# Patient Record
Sex: FEMALE | Race: OTHER | HISPANIC OR LATINO
[De-identification: names, ages, dates, MRNs, and addresses within clinical notes are randomized per-mention and may not be internally consistent; named-entity substitution may affect disease eponyms.]

---

## 2018-08-19 PROBLEM — Z00.00 ENCOUNTER FOR PREVENTIVE HEALTH EXAMINATION: Status: ACTIVE | Noted: 2018-08-19

## 2018-09-18 ENCOUNTER — APPOINTMENT (OUTPATIENT)
Dept: OTOLARYNGOLOGY | Facility: CLINIC | Age: 53
End: 2018-09-18
Payer: COMMERCIAL

## 2018-09-18 VITALS
BODY MASS INDEX: 24.98 KG/M2 | WEIGHT: 141 LBS | DIASTOLIC BLOOD PRESSURE: 87 MMHG | OXYGEN SATURATION: 97 % | HEART RATE: 85 BPM | HEIGHT: 63 IN | SYSTOLIC BLOOD PRESSURE: 119 MMHG

## 2018-09-18 DIAGNOSIS — D49.0 NEOPLASM OF UNSPECIFIED BEHAVIOR OF DIGESTIVE SYSTEM: ICD-10-CM

## 2018-09-18 DIAGNOSIS — G51.0 BELL'S PALSY: ICD-10-CM

## 2018-09-18 PROCEDURE — 99244 OFF/OP CNSLTJ NEW/EST MOD 40: CPT

## 2018-09-19 PROBLEM — D49.0 PAROTID TUMOR: Status: RESOLVED | Noted: 2018-09-19 | Resolved: 2018-09-19

## 2018-09-19 RX ORDER — FLUTICASONE PROPIONATE 50 MCG
SPRAY, SUSPENSION NASAL
Refills: 0 | Status: ACTIVE | COMMUNITY

## 2018-09-21 PROBLEM — G51.0 BELL'S PALSY: Status: ACTIVE | Noted: 2018-09-21

## 2018-09-25 ENCOUNTER — APPOINTMENT (OUTPATIENT)
Dept: OTOLARYNGOLOGY | Facility: CLINIC | Age: 53
End: 2018-09-25
Payer: COMMERCIAL

## 2018-09-25 VITALS
WEIGHT: 140 LBS | HEIGHT: 63 IN | SYSTOLIC BLOOD PRESSURE: 155 MMHG | BODY MASS INDEX: 24.8 KG/M2 | DIASTOLIC BLOOD PRESSURE: 95 MMHG | OXYGEN SATURATION: 98 % | HEART RATE: 85 BPM | TEMPERATURE: 98 F

## 2018-09-25 DIAGNOSIS — Z86.2 PERSONAL HISTORY OF DISEASES OF THE BLOOD AND BLOOD-FORMING ORGANS AND CERTAIN DISORDERS INVOLVING THE IMMUNE MECHANISM: ICD-10-CM

## 2018-09-25 PROCEDURE — 64612 DESTROY NERVE FACE MUSCLE: CPT | Mod: 50

## 2018-09-25 RX ORDER — ARNICA MONTANA 30 [HP_C]/1
PELLET ORAL
Refills: 0 | Status: ACTIVE | COMMUNITY

## 2018-10-11 ENCOUNTER — APPOINTMENT (OUTPATIENT)
Dept: OTOLARYNGOLOGY | Facility: AMBULATORY SURGERY CENTER | Age: 53
End: 2018-10-11
Payer: COMMERCIAL

## 2018-10-11 VITALS
HEART RATE: 86 BPM | OXYGEN SATURATION: 98 % | DIASTOLIC BLOOD PRESSURE: 85 MMHG | BODY MASS INDEX: 24.8 KG/M2 | TEMPERATURE: 98.1 F | WEIGHT: 140 LBS | HEIGHT: 63 IN | SYSTOLIC BLOOD PRESSURE: 123 MMHG

## 2018-10-11 DIAGNOSIS — Z87.891 PERSONAL HISTORY OF NICOTINE DEPENDENCE: ICD-10-CM

## 2018-10-11 DIAGNOSIS — Z78.9 OTHER SPECIFIED HEALTH STATUS: ICD-10-CM

## 2018-10-11 PROCEDURE — 64612 DESTROY NERVE FACE MUSCLE: CPT

## 2018-10-11 PROCEDURE — 99213 OFFICE O/P EST LOW 20 MIN: CPT | Mod: 25

## 2018-10-29 PROBLEM — Z86.2 HISTORY OF SARCOIDOSIS: Status: RESOLVED | Noted: 2018-09-19 | Resolved: 2018-10-29

## 2019-01-15 ENCOUNTER — APPOINTMENT (OUTPATIENT)
Dept: OTOLARYNGOLOGY | Facility: CLINIC | Age: 54
End: 2019-01-15
Payer: COMMERCIAL

## 2019-01-15 VITALS
WEIGHT: 140 LBS | BODY MASS INDEX: 24.8 KG/M2 | HEART RATE: 80 BPM | OXYGEN SATURATION: 97 % | HEIGHT: 63 IN | DIASTOLIC BLOOD PRESSURE: 85 MMHG | SYSTOLIC BLOOD PRESSURE: 139 MMHG

## 2019-01-15 DIAGNOSIS — R25.8 OTHER ABNORMAL INVOLUNTARY MOVEMENTS: ICD-10-CM

## 2019-01-15 PROCEDURE — 64612 DESTROY NERVE FACE MUSCLE: CPT

## 2019-01-17 PROBLEM — R25.8 SYNKINESIS: Status: ACTIVE | Noted: 2018-09-18

## 2019-02-12 ENCOUNTER — APPOINTMENT (OUTPATIENT)
Dept: OTOLARYNGOLOGY | Facility: CLINIC | Age: 54
End: 2019-02-12

## 2019-02-25 NOTE — PROCEDURE
[FreeTextEntry1] : Botox injection to bilateral face [FreeTextEntry2] : synkinesis [FreeTextEntry3] : Botox was injected after informed consent was obtained. The areas injected were noted on a Botox map. A total of units 95 was injected.  Patient tolerated the procedure well and will return for followup for evaluation of the treated areas and photos in 2 weeks and additional treatment in 3-4 months time.\par

## 2019-02-25 NOTE — PHYSICAL EXAM
[Normal] : no rashes [de-identified] : Cranial nerves II-XII intact with the exception of CN VII: left facial synkinesis at patient's baseline

## 2019-02-25 NOTE — ASSESSMENT
[FreeTextEntry1] : Nichelle is a 54-year-old female who presents for Botox injection for left facial synkinesis. She underwent her second Botox treatment today. The areas injected were marked on a diagram. The patient tolerated the procedure well.  She verbalized understanding to aftercare instructions. Follow up in 3 months for reevaluation and additional Botox treatment. In addition, pt was encouraged to continue a facial nerve directed physical therapy exercises. Continue with eye care.  All questions answered.

## 2019-02-25 NOTE — HISTORY OF PRESENT ILLNESS
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  She presents today for reevaluation and additional Botox treatment. She has had several Botox injections in the past, first treatment with Dr. YORK on 9/25/18.  After this treatment she reported that the tightness in her left face and neck was improved significantly. The recent Botox injection alleviated her facial pain and tightness significantly more than previous treatments. Unfortunately, this cervicofacial spasm has returned.  \par \par The patient's medical history, surgical history, medications, and allergies remain unchanged.

## 2019-02-25 NOTE — CONSULT LETTER
[FreeTextEntry1] : Dear Dr. carmichael, \par \par Ms. TEODORA ESTEVES presents in follow-up today. Attached is a summary of her office visit. I will continue to keep you apprised of her care. If you should have any further questions or concerns, please do not hesitate to call or write.\par \par Yours sincerely,\par \par Patricia YORK M.D., FACS\par

## 2019-03-14 ENCOUNTER — APPOINTMENT (OUTPATIENT)
Dept: OTOLARYNGOLOGY | Facility: AMBULATORY SURGERY CENTER | Age: 54
End: 2019-03-14

## 2019-04-16 ENCOUNTER — APPOINTMENT (OUTPATIENT)
Dept: OTOLARYNGOLOGY | Facility: CLINIC | Age: 54
End: 2019-04-16

## 2022-03-11 ENCOUNTER — RESULT REVIEW (OUTPATIENT)
Age: 57
End: 2022-03-11

## 2022-03-11 NOTE — ASU PATIENT PROFILE, ADULT - VISION (WITH CORRECTIVE LENSES IF THE PATIENT USUALLY WEARS THEM):
glasses to drive/Partially impaired: cannot see medication labels or newsprint, but can see obstacles in path, and the surrounding layout; can count fingers at arm's length

## 2022-03-11 NOTE — ASU PATIENT PROFILE, ADULT - NSICDXPASTSURGICALHX_GEN_ALL_CORE_FT
PAST SURGICAL HISTORY:  H/O breast implant     H/O  section     History of lung biopsy     History of parotid gland removal

## 2022-03-11 NOTE — ASU PATIENT PROFILE, ADULT - FALL HARM RISK - UNIVERSAL INTERVENTIONS
Bed in lowest position, wheels locked, appropriate side rails in place/Call bell, personal items and telephone in reach/Instruct patient to call for assistance before getting out of bed or chair/Non-slip footwear when patient is out of bed/[place] to call system/Physically safe environment - no spills, clutter or unnecessary equipment/Purposeful Proactive Rounding/Room/bathroom lighting operational, light cord in reach

## 2022-03-11 NOTE — ASU PATIENT PROFILE, ADULT - NS PREOP UNDERSTANDS INFO
7am arrival as per call from [facility]. Water no later than 5am day of surgery/yes

## 2022-03-13 ENCOUNTER — TRANSCRIPTION ENCOUNTER (OUTPATIENT)
Age: 57
End: 2022-03-13

## 2022-03-14 ENCOUNTER — OUTPATIENT (OUTPATIENT)
Dept: OUTPATIENT SERVICES | Facility: HOSPITAL | Age: 57
LOS: 1 days | Discharge: HOME | End: 2022-03-14
Payer: COMMERCIAL

## 2022-03-14 ENCOUNTER — RESULT REVIEW (OUTPATIENT)
Age: 57
End: 2022-03-14

## 2022-03-14 ENCOUNTER — TRANSCRIPTION ENCOUNTER (OUTPATIENT)
Age: 57
End: 2022-03-14

## 2022-03-14 VITALS
SYSTOLIC BLOOD PRESSURE: 142 MMHG | HEART RATE: 99 BPM | OXYGEN SATURATION: 98 % | DIASTOLIC BLOOD PRESSURE: 85 MMHG | RESPIRATION RATE: 16 BRPM

## 2022-03-14 VITALS
RESPIRATION RATE: 16 BRPM | TEMPERATURE: 97 F | WEIGHT: 134.26 LBS | SYSTOLIC BLOOD PRESSURE: 121 MMHG | OXYGEN SATURATION: 98 % | HEART RATE: 84 BPM | HEIGHT: 63 IN | DIASTOLIC BLOOD PRESSURE: 87 MMHG

## 2022-03-14 DIAGNOSIS — Z90.49 ACQUIRED ABSENCE OF OTHER SPECIFIED PARTS OF DIGESTIVE TRACT: Chronic | ICD-10-CM

## 2022-03-14 DIAGNOSIS — Z98.890 OTHER SPECIFIED POSTPROCEDURAL STATES: Chronic | ICD-10-CM

## 2022-03-14 DIAGNOSIS — Z98.82 BREAST IMPLANT STATUS: Chronic | ICD-10-CM

## 2022-03-14 DIAGNOSIS — Z98.891 HISTORY OF UTERINE SCAR FROM PREVIOUS SURGERY: Chronic | ICD-10-CM

## 2022-03-14 PROCEDURE — 88305 TISSUE EXAM BY PATHOLOGIST: CPT | Mod: 26

## 2022-03-14 DEVICE — CLIP APPLIER COVIDIEN SURGICLIP III 9" SM: Type: IMPLANTABLE DEVICE | Status: FUNCTIONAL

## 2022-03-14 DEVICE — LIGATING CLIPS SYNOVIS SUPERFINE MICROCLIP 6: Type: IMPLANTABLE DEVICE | Status: FUNCTIONAL

## 2022-03-14 DEVICE — CARTRIDGE MICROCLIP 30: Type: IMPLANTABLE DEVICE | Status: FUNCTIONAL

## 2022-03-14 RX ORDER — FENTANYL CITRATE 50 UG/ML
25 INJECTION INTRAVENOUS
Refills: 0 | Status: DISCONTINUED | OUTPATIENT
Start: 2022-03-14 | End: 2022-03-14

## 2022-03-14 RX ORDER — SODIUM CHLORIDE 9 MG/ML
1000 INJECTION, SOLUTION INTRAVENOUS
Refills: 0 | Status: DISCONTINUED | OUTPATIENT
Start: 2022-03-14 | End: 2022-03-14

## 2022-03-14 RX ORDER — ACETAMINOPHEN 500 MG
650 TABLET ORAL ONCE
Refills: 0 | Status: COMPLETED | OUTPATIENT
Start: 2022-03-14 | End: 2022-03-14

## 2022-03-14 RX ORDER — APREPITANT 80 MG/1
40 CAPSULE ORAL ONCE
Refills: 0 | Status: COMPLETED | OUTPATIENT
Start: 2022-03-14 | End: 2022-03-14

## 2022-03-14 RX ORDER — ACETAMINOPHEN 500 MG
1000 TABLET ORAL ONCE
Refills: 0 | Status: COMPLETED | OUTPATIENT
Start: 2022-03-14 | End: 2022-03-14

## 2022-03-14 RX ADMIN — Medication 400 MILLIGRAM(S): at 21:16

## 2022-03-14 RX ADMIN — Medication 650 MILLIGRAM(S): at 09:20

## 2022-03-14 RX ADMIN — APREPITANT 40 MILLIGRAM(S): 80 CAPSULE ORAL at 09:20

## 2022-03-14 RX ADMIN — SODIUM CHLORIDE 100 MILLILITER(S): 9 INJECTION, SOLUTION INTRAVENOUS at 20:58

## 2022-03-14 NOTE — ASU DISCHARGE PLAN (ADULT/PEDIATRIC) - NS MD DC FALL RISK RISK
For information on Fall & Injury Prevention, visit: [email or web address] OR  [email or web address] OR  https://www.cdc.gov/steadi/patient.html

## 2022-03-21 LAB — SURGICAL PATHOLOGY STUDY: SIGNIFICANT CHANGE UP

## 2025-06-14 NOTE — ASU PATIENT PROFILE, ADULT - NPO AFTER
When discharged, take only medications prescribed as instructed by your hospital provider    Do not stop or change any medications until you discuss changes with your own prescriber    Do not take any other medications, including left over medications from before your admission, over the counter medications or herbal supplements, unless you discuss with your own provider 23:59

## (undated) DEVICE — SUT ETHILON 3-0 18" PS-1

## (undated) DEVICE — ELCTR BIPOLAR CORD 12FT

## (undated) DEVICE — STIM SURG NERVE CHECKPOINT

## (undated) DEVICE — VENODYNE/SCD SLEEVE CALF MEDIUM

## (undated) DEVICE — ELCTR COLORADO 3CM

## (undated) DEVICE — SPEAR SURG EYE WECK-CELL CELOS

## (undated) DEVICE — VESSEL LOOP MAXI-BLUE 0.120" X 16"

## (undated) DEVICE — DRSG SURG-O-FLEX 2 X 24"

## (undated) DEVICE — DRSG GAUZE FLUFF 1PLY 18X30"

## (undated) DEVICE — DRSG KERLIX ROLL 4.5"

## (undated) DEVICE — NDL HYPO REGULAR BEVEL 27G X 0.5" (GRAY)

## (undated) DEVICE — SUT VICRYL 6-0 18" P-3 UNDYED

## (undated) DEVICE — SUT ETHILON 5-0 18" P-3

## (undated) DEVICE — SUT ETHILON 10-0 12" CS160-6

## (undated) DEVICE — SUT ETHILON 8-0 12" TG100-8

## (undated) DEVICE — BLADE SURGICAL #15 CARBON

## (undated) DEVICE — PACK FACIALPLASTY

## (undated) DEVICE — DRSG TELFA 3 X 8

## (undated) DEVICE — VESSEL LOOP MINI-BLUE 0.075" X 16"

## (undated) DEVICE — SUT PLAIN GUT 0 27" CT-3

## (undated) DEVICE — SUT ETHILON 9-0 6" VAS100-4

## (undated) DEVICE — DRAIN BLAKE 10FR ROUND

## (undated) DEVICE — PREP BETADINE SPONGE STICKS

## (undated) DEVICE — MARKING PEN DEVON DUAL TIP W RULER

## (undated) DEVICE — DRSG STERISTRIPS 0.25 X 4"

## (undated) DEVICE — DRSG XEROFORM 5 X 9"

## (undated) DEVICE — VESSEL LOOP MAXI-RED  0.120" X 16"

## (undated) DEVICE — SUT PDS II 3-0 18" PS-2 UNDYED

## (undated) DEVICE — GLV 6 PROTEXIS (WHITE)

## (undated) DEVICE — DRAIN JACKSON PRATT 15FR ROUND END W TROCAR

## (undated) DEVICE — VESSEL LOOP MAXI-YELLOW  0.120" X 16"

## (undated) DEVICE — DRSG GAUZE MOISTURIZER 0.5 OZ 4X8

## (undated) DEVICE — TUBING SUCTION NONCONDUCTIVE 6MM X 12FT

## (undated) DEVICE — SUT ETHILON 6-0 18" P-3

## (undated) DEVICE — DRSG ACE BANDAGE 4"

## (undated) DEVICE — SUT VICRYL 5-0 18" P-3 UNDYED